# Patient Record
Sex: MALE | Race: WHITE | NOT HISPANIC OR LATINO | URBAN - METROPOLITAN AREA
[De-identification: names, ages, dates, MRNs, and addresses within clinical notes are randomized per-mention and may not be internally consistent; named-entity substitution may affect disease eponyms.]

---

## 2022-12-13 ENCOUNTER — EMERGENCY (EMERGENCY)
Facility: HOSPITAL | Age: 32
LOS: 1 days | Discharge: HOME | End: 2022-12-13
Attending: STUDENT IN AN ORGANIZED HEALTH CARE EDUCATION/TRAINING PROGRAM | Admitting: STUDENT IN AN ORGANIZED HEALTH CARE EDUCATION/TRAINING PROGRAM
Payer: COMMERCIAL

## 2022-12-13 VITALS
HEIGHT: 71 IN | DIASTOLIC BLOOD PRESSURE: 78 MMHG | HEART RATE: 109 BPM | SYSTOLIC BLOOD PRESSURE: 119 MMHG | TEMPERATURE: 100 F | WEIGHT: 149.91 LBS | OXYGEN SATURATION: 95 % | RESPIRATION RATE: 18 BRPM

## 2022-12-13 VITALS
HEART RATE: 111 BPM | SYSTOLIC BLOOD PRESSURE: 128 MMHG | DIASTOLIC BLOOD PRESSURE: 72 MMHG | TEMPERATURE: 100 F | OXYGEN SATURATION: 95 % | RESPIRATION RATE: 16 BRPM

## 2022-12-13 DIAGNOSIS — R19.7 DIARRHEA, UNSPECIFIED: ICD-10-CM

## 2022-12-13 DIAGNOSIS — R00.0 TACHYCARDIA, UNSPECIFIED: ICD-10-CM

## 2022-12-13 DIAGNOSIS — J10.1 INFLUENZA DUE TO OTHER IDENTIFIED INFLUENZA VIRUS WITH OTHER RESPIRATORY MANIFESTATIONS: ICD-10-CM

## 2022-12-13 DIAGNOSIS — R11.2 NAUSEA WITH VOMITING, UNSPECIFIED: ICD-10-CM

## 2022-12-13 DIAGNOSIS — D72.829 ELEVATED WHITE BLOOD CELL COUNT, UNSPECIFIED: ICD-10-CM

## 2022-12-13 DIAGNOSIS — Z20.822 CONTACT WITH AND (SUSPECTED) EXPOSURE TO COVID-19: ICD-10-CM

## 2022-12-13 LAB
ALBUMIN SERPL ELPH-MCNC: 4.9 G/DL — SIGNIFICANT CHANGE UP (ref 3.3–5)
ALP SERPL-CCNC: 48 U/L — SIGNIFICANT CHANGE UP (ref 40–120)
ALT FLD-CCNC: 15 U/L — SIGNIFICANT CHANGE UP (ref 10–45)
ANION GAP SERPL CALC-SCNC: 16 MMOL/L — SIGNIFICANT CHANGE UP (ref 5–17)
AST SERPL-CCNC: 23 U/L — SIGNIFICANT CHANGE UP (ref 10–40)
BASOPHILS # BLD AUTO: 0.02 K/UL — SIGNIFICANT CHANGE UP (ref 0–0.2)
BASOPHILS NFR BLD AUTO: 0.1 % — SIGNIFICANT CHANGE UP (ref 0–2)
BILIRUB SERPL-MCNC: 0.7 MG/DL — SIGNIFICANT CHANGE UP (ref 0.2–1.2)
BUN SERPL-MCNC: 16 MG/DL — SIGNIFICANT CHANGE UP (ref 7–23)
CALCIUM SERPL-MCNC: 9.7 MG/DL — SIGNIFICANT CHANGE UP (ref 8.4–10.5)
CHLORIDE SERPL-SCNC: 95 MMOL/L — LOW (ref 96–108)
CO2 SERPL-SCNC: 23 MMOL/L — SIGNIFICANT CHANGE UP (ref 22–31)
CREAT SERPL-MCNC: 1.19 MG/DL — SIGNIFICANT CHANGE UP (ref 0.5–1.3)
EGFR: 83 ML/MIN/1.73M2 — SIGNIFICANT CHANGE UP
EOSINOPHIL # BLD AUTO: 0 K/UL — SIGNIFICANT CHANGE UP (ref 0–0.5)
EOSINOPHIL NFR BLD AUTO: 0 % — SIGNIFICANT CHANGE UP (ref 0–6)
FLUAV AG NPH QL: DETECTED
FLUBV AG NPH QL: SIGNIFICANT CHANGE UP
GLUCOSE SERPL-MCNC: 112 MG/DL — HIGH (ref 70–99)
HCT VFR BLD CALC: 43.4 % — SIGNIFICANT CHANGE UP (ref 39–50)
HGB BLD-MCNC: 15 G/DL — SIGNIFICANT CHANGE UP (ref 13–17)
IMM GRANULOCYTES NFR BLD AUTO: 0.4 % — SIGNIFICANT CHANGE UP (ref 0–0.9)
LYMPHOCYTES # BLD AUTO: 0.55 K/UL — LOW (ref 1–3.3)
LYMPHOCYTES # BLD AUTO: 3.4 % — LOW (ref 13–44)
MAGNESIUM SERPL-MCNC: 1.6 MG/DL — SIGNIFICANT CHANGE UP (ref 1.6–2.6)
MCHC RBC-ENTMCNC: 30.9 PG — SIGNIFICANT CHANGE UP (ref 27–34)
MCHC RBC-ENTMCNC: 34.6 GM/DL — SIGNIFICANT CHANGE UP (ref 32–36)
MCV RBC AUTO: 89.5 FL — SIGNIFICANT CHANGE UP (ref 80–100)
MONOCYTES # BLD AUTO: 0.88 K/UL — SIGNIFICANT CHANGE UP (ref 0–0.9)
MONOCYTES NFR BLD AUTO: 5.5 % — SIGNIFICANT CHANGE UP (ref 2–14)
NEUTROPHILS # BLD AUTO: 14.54 K/UL — HIGH (ref 1.8–7.4)
NEUTROPHILS NFR BLD AUTO: 90.6 % — HIGH (ref 43–77)
NRBC # BLD: 0 /100 WBCS — SIGNIFICANT CHANGE UP (ref 0–0)
PHOSPHATE SERPL-MCNC: 2.8 MG/DL — SIGNIFICANT CHANGE UP (ref 2.5–4.5)
PLATELET # BLD AUTO: 254 K/UL — SIGNIFICANT CHANGE UP (ref 150–400)
POTASSIUM SERPL-MCNC: 4.7 MMOL/L — SIGNIFICANT CHANGE UP (ref 3.5–5.3)
POTASSIUM SERPL-SCNC: 4.7 MMOL/L — SIGNIFICANT CHANGE UP (ref 3.5–5.3)
PROT SERPL-MCNC: 8.1 G/DL — SIGNIFICANT CHANGE UP (ref 6–8.3)
RBC # BLD: 4.85 M/UL — SIGNIFICANT CHANGE UP (ref 4.2–5.8)
RBC # FLD: 12.1 % — SIGNIFICANT CHANGE UP (ref 10.3–14.5)
RSV RNA NPH QL NAA+NON-PROBE: SIGNIFICANT CHANGE UP
SARS-COV-2 RNA SPEC QL NAA+PROBE: SIGNIFICANT CHANGE UP
SODIUM SERPL-SCNC: 134 MMOL/L — LOW (ref 135–145)
WBC # BLD: 16.05 K/UL — HIGH (ref 3.8–10.5)
WBC # FLD AUTO: 16.05 K/UL — HIGH (ref 3.8–10.5)

## 2022-12-13 PROCEDURE — 99284 EMERGENCY DEPT VISIT MOD MDM: CPT

## 2022-12-13 PROCEDURE — 36415 COLL VENOUS BLD VENIPUNCTURE: CPT

## 2022-12-13 PROCEDURE — 99284 EMERGENCY DEPT VISIT MOD MDM: CPT | Mod: 25

## 2022-12-13 PROCEDURE — 96375 TX/PRO/DX INJ NEW DRUG ADDON: CPT

## 2022-12-13 PROCEDURE — 84100 ASSAY OF PHOSPHORUS: CPT

## 2022-12-13 PROCEDURE — 83735 ASSAY OF MAGNESIUM: CPT

## 2022-12-13 PROCEDURE — 80053 COMPREHEN METABOLIC PANEL: CPT

## 2022-12-13 PROCEDURE — 85025 COMPLETE CBC W/AUTO DIFF WBC: CPT

## 2022-12-13 PROCEDURE — 87637 SARSCOV2&INF A&B&RSV AMP PRB: CPT

## 2022-12-13 PROCEDURE — 96374 THER/PROPH/DIAG INJ IV PUSH: CPT

## 2022-12-13 RX ORDER — ACETAMINOPHEN 500 MG
975 TABLET ORAL ONCE
Refills: 0 | Status: COMPLETED | OUTPATIENT
Start: 2022-12-13 | End: 2022-12-13

## 2022-12-13 RX ORDER — ONDANSETRON 8 MG/1
4 TABLET, FILM COATED ORAL ONCE
Refills: 0 | Status: COMPLETED | OUTPATIENT
Start: 2022-12-13 | End: 2022-12-13

## 2022-12-13 RX ORDER — SODIUM CHLORIDE 9 MG/ML
1000 INJECTION INTRAMUSCULAR; INTRAVENOUS; SUBCUTANEOUS ONCE
Refills: 0 | Status: COMPLETED | OUTPATIENT
Start: 2022-12-13 | End: 2022-12-13

## 2022-12-13 RX ORDER — ONDANSETRON 8 MG/1
1 TABLET, FILM COATED ORAL
Qty: 9 | Refills: 0
Start: 2022-12-13

## 2022-12-13 RX ORDER — KETOROLAC TROMETHAMINE 30 MG/ML
15 SYRINGE (ML) INJECTION ONCE
Refills: 0 | Status: DISCONTINUED | OUTPATIENT
Start: 2022-12-13 | End: 2022-12-13

## 2022-12-13 RX ADMIN — Medication 15 MILLIGRAM(S): at 20:28

## 2022-12-13 RX ADMIN — Medication 975 MILLIGRAM(S): at 21:39

## 2022-12-13 RX ADMIN — SODIUM CHLORIDE 1000 MILLILITER(S): 9 INJECTION INTRAMUSCULAR; INTRAVENOUS; SUBCUTANEOUS at 20:25

## 2022-12-13 RX ADMIN — Medication 15 MILLIGRAM(S): at 20:41

## 2022-12-13 RX ADMIN — SODIUM CHLORIDE 1000 MILLILITER(S): 9 INJECTION INTRAMUSCULAR; INTRAVENOUS; SUBCUTANEOUS at 20:41

## 2022-12-13 RX ADMIN — ONDANSETRON 4 MILLIGRAM(S): 8 TABLET, FILM COATED ORAL at 20:28

## 2022-12-13 NOTE — ED PROVIDER NOTE - PATIENT PORTAL LINK FT
You can access the FollowMyHealth Patient Portal offered by [facility] by registering at the following website: [email or web address]. By joining [employer]’s FollowMyHealth portal, you will also be able to view your health information using other applications (apps) compatible with our system. You can access the FollowMyHealth Patient Portal offered by [facility] by registering at the following website: [email or web address]. By joining [employer]’s FollowMyHealth portal, you will also be able to view your health information using other applications (apps) compatible with our system.

## 2022-12-13 NOTE — ED PROVIDER NOTE - PROGRESS NOTE DETAILS
pt feeling better, tolerating PO, will d/c, continue supportive care, return precautions for worsening symptoms

## 2022-12-13 NOTE — ED PROVIDER NOTE - OBJECTIVE STATEMENT
33 y/o m no pmh presents c/o fever, body aches, n/v/d, cough x 3 days.  Pt hasn't been taking anything for his symptoms, reports he has been vomiting all day today, can't keep anything down PO.  Denies abd pain, urinary sx, CP, SOB, sore throat, all other ROS negative.

## 2022-12-13 NOTE — ED PROVIDER NOTE - CLINICAL SUMMARY MEDICAL DECISION MAKING FREE TEXT BOX
33 y/o m presents with flu like sx x 3 days.  Afebrile in ED, mildly tachy at triage.  Exam unremarkable, viral swab + for influenza.  Pt given IV fluid, antiemetics, will check labs, f/u results and reassess for improvement, PO trial

## 2022-12-13 NOTE — ED PROVIDER NOTE - PSYCHIATRIC, MLM
Alert and oriented to person, place, time/situation. normal mood and affect. no apparent risk to self or others. Marima Araujorn

## 2022-12-13 NOTE — ED ADULT NURSE NOTE - OBJECTIVE STATEMENT
32y male presents to ED c/o nausea, cough, and body aches x3 days. Denies chest pain, sob. Speaking in full and complete sentences. A&Ox4.

## 2022-12-13 NOTE — ED PROVIDER NOTE - ATTENDING APP SHARED VISIT CONTRIBUTION OF CARE
33 y/o m no pmh presents c/o fever, body aches, n/v/d, cough x 3 days.  Pt hasn't been taking anything for his symptoms, reports he has been vomiting all day today, can't keep anything down PO.  Denies abd pain, urinary sx, CP, SOB, sore throat, all other ROS negative. exam and history per pa. patient well appearing, abdominal exam benign, nontender. laboratory workup with leukocytosis, likely reactive in setting of vomiting. rehydrated in Ed, given symptom control, repeat abdominal exam continues to be benign, tolerating PO, flu+, likely cause of symptoms, less likely intrinsic dangerous abdominal pathology in setting of flu+ and benign exam, imaging deferred at this time, dc with strict return precautions

## 2022-12-13 NOTE — ED PROVIDER NOTE - NSFOLLOWUPINSTRUCTIONS_ED_ALL_ED_FT
Influenza, Adult      Influenza, also called "the flu," is a viral infection that mainly affects the respiratory tract. This includes the lungs, nose, and throat. The flu spreads easily from person to person (is contagious). It causes common cold symptoms, along with high fever and body aches.      What are the causes?    This condition is caused by the influenza virus. You can get the virus by:  •Breathing in droplets that are in the air from an infected person's cough or sneeze.      •Touching something that has the virus on it (has been contaminated) and then touching your mouth, nose, or eyes.        What increases the risk?    The following factors may make you more likely to get the flu:  •Not washing or sanitizing your hands often.      •Having close contact with many people during cold and flu season.      •Touching your mouth, eyes, or nose without first washing or sanitizing your hands.      •Not getting an annual flu shot.      You may have a higher risk for the flu, including serious problems, such as a lung infection (pneumonia), if you:  •Are older than 65.      •Are pregnant.      •Have a weakened disease-fighting system (immune system). This includes people who have HIV or AIDS, are on chemotherapy, or are taking medicines that reduce (suppress) the immune system.      •Have a long-term (chronic) illness, such as heart disease, kidney disease, diabetes, or lung disease.      •Have a liver disorder.      •Are severely overweight (morbidly obese).      •Have anemia.      •Have asthma.        What are the signs or symptoms?    Symptoms of this condition usually begin suddenly and last 4–14 days. These may include:  •Fever and chills.      •Headaches, body aches, or muscle aches.      •Sore throat.      •Cough.      •Runny or stuffy (congested) nose.      •Chest discomfort.      •Poor appetite.      •Weakness or fatigue.      •Dizziness.      •Nausea or vomiting.        How is this diagnosed?    This condition may be diagnosed based on:  •Your symptoms and medical history.      •A physical exam.       •Swabbing your nose or throat and testing the fluid for the influenza virus.        How is this treated?    If the flu is diagnosed early, you can be treated with antiviral medicine that is given by mouth (orally) or through an IV. This can help reduce how severe the illness is and how long it lasts.    Taking care of yourself at home can help relieve symptoms. Your health care provider may recommend:  •Taking over-the-counter medicines.      •Drinking plenty of fluids.      In many cases, the flu goes away on its own. If you have severe symptoms or complications, you may be treated in a hospital.      Follow these instructions at home:    Activity     •Rest as needed and get plenty of sleep.      •Stay home from work or school as told by your health care provider. Unless you are visiting your health care provider, avoid leaving home until your fever has been gone for 24 hours without taking medicine.      Eating and drinking     •Take an oral rehydration solution (ORS). This is a drink that is sold at pharmacies and retail stores.      •Drink enough fluid to keep your urine pale yellow.      •Drink clear fluids in small amounts as you are able. Clear fluids include water, ice chips, fruit juice mixed with water, and low-calorie sports drinks.      •Eat bland, easy-to-digest foods in small amounts as you are able. These foods include bananas, applesauce, rice, lean meats, toast, and crackers.      •Avoid drinking fluids that contain a lot of sugar or caffeine, such as energy drinks, regular sports drinks, and soda.      •Avoid alcohol.      •Avoid spicy or fatty foods.        General instructions                   •Take over-the-counter and prescription medicines only as told by your health care provider.    •Use a cool mist humidifier to add humidity to the air in your home. This can make it easier to breathe.  •When using a cool mist humidifier, clean it daily. Empty the water and replace it with clean water.        •Cover your mouth and nose when you cough or sneeze.      •Wash your hands with soap and water often and for at least 20 seconds, especially after you cough or sneeze. If soap and water are not available, use alcohol-based hand .      •Keep all follow-up visits. This is important.        How is this prevented?     •Get an annual flu shot. This is usually available in late summer, fall, or winter. Ask your health care provider when you should get your flu shot.      •Avoid contact with people who are sick during cold and flu season. This is generally fall and winter.        Contact a health care provider if:    •You develop new symptoms.    •You have:  •Chest pain.      •Diarrhea.      •A fever.        •Your cough gets worse.      •You produce more mucus.      •You feel nauseous or you vomit.        Get help right away if you:    •Develop shortness of breath or have difficulty breathing.      •Have skin or nails that turn a bluish color.      •Have severe pain or stiffness in your neck.      •Develop a sudden headache or sudden pain in your face or ear.      •Cannot eat or drink without vomiting.      These symptoms may represent a serious problem that is an emergency. Do not wait to see if the symptoms will go away. Get medical help right away. Call your local emergency services (911 in the [place] Do not drive yourself to the hospital.       Summary    •Influenza, also called "the flu," is a viral infection that primarily affects your respiratory tract.      •Symptoms of the flu usually begin suddenly and last 4–14 days.      •Getting an annual flu shot is the best way to prevent getting the flu.      •Stay home from work or school as told by your health care provider. Unless you are visiting your health care provider, avoid leaving home until your fever has been gone for 24 hours without taking medicine.      •Keep all follow-up visits. This is important.      This information is not intended to replace advice given to you by your health care provider. Make sure you discuss any questions you have with your health care provider.

## 2022-12-13 NOTE — ED PROVIDER NOTE - NS ED ATTENDING STATEMENT MOD
This was a shared visit with the ESTER. I reviewed and verified the documentation and independently performed the documented: